# Patient Record
Sex: FEMALE | Race: WHITE
[De-identification: names, ages, dates, MRNs, and addresses within clinical notes are randomized per-mention and may not be internally consistent; named-entity substitution may affect disease eponyms.]

---

## 2018-02-20 NOTE — EKG
[place] [address]
Phone:  [phone]                     ELECTROCARDIOGRAM REPORT      
_______________________________________________________________________________
 
Name:       LANE KING                  [facility]                      [id number]      Account #:      [id number]  
Admission:  18     Attend Phys:                         
Discharge:  18     Date of Birth:  [birth year]  
         Report #: [id number]
    [id number]
_______________________________________________________________________________
THIS REPORT FOR:  //name//                      
 
                         [facility] ED
                                       
Test Date:    2018               Test Time:    21:53:46
Pat Name:     LANE KING              Department:   
Patient ID:   [id number]            Room:          
Gender:       F                        Technician:   VITOR
:          1945               Requested By: Bladimir Souza
Order Number: [id number] MD:   Gray Mcgraw
                                 Measurements
Intervals                              Axis          
Rate:         82                       P:            65
AL:           190                      QRS:          28
QRSD:         132                      T:            6
QT:           406                                    
QTc:          475                                    
                           Interpretive Statements
Sinus rhythm
 
Compared to ECG 10/18/2015 23:09:14
 
First degree AV block no longer present
Incomplete right bundle-branch block no longer present
 
Electronically Signed On 2018 11:00:53 CST by Gray Mcgraw
[email or web address]
 
 
 
 
 
 
 
 
 
 
 
 
 
 
 
 
  <ELECTRONICALLY SIGNED>
                                           By: Gray Mcgraw MD, [facility]      
  18     1100
D: 18   _____________________________________
T: 18   Gray Mcgraw MD, FACC        /EPI

## 2018-07-10 NOTE — EKG
[place] [address]
Phone:  [phone]                     ELECTROCARDIOGRAM REPORT      
_______________________________________________________________________________
 
Name:       LANE KING                  Room:                      [facility]#:  [id number]      Account #:      [id number]  
Admission:  18     Attend Phys:                         
Discharge:  07/10/18     Date of Birth:  [birth year]  
         Report #: [id number]
    [id number]
_______________________________________________________________________________
THIS REPORT FOR:  //name//                      
 
                         [facility] ED
                                       
Test Date:    2018               Test Time:    23:29:36
Pat Name:     LANE KING              Department:   
Patient ID:   [id number]            Room:          
Gender:       F                        Technician:   MS
:          1945               Requested By: Tio Freed
Order Number: [id number] MD:   Wilton Tabares
                                 Measurements
Intervals                              Axis          
Rate:         71                       P:            69
CO:           189                      QRS:          19
QRSD:         100                      T:            31
QT:           430                                    
QTc:          468                                    
                           Interpretive Statements
Sinus rhythm
Probable left atrial enlargement
Low voltage, precordial leads
Baseline wander in lead(s) I
Compared to ECG 2018 21:53:46
Low QRS voltage now present
 
Electronically Signed On 7- 14:31:20 CDT by Wilton Tabares
[email or web address]
 
 
 
 
 
 
 
 
 
 
 
 
 
 
 
 
  <ELECTRONICALLY SIGNED>
                                           By: Wilton Tabares MD, FACC   
  07/10/18     1431
D: 18 2329   _____________________________________
T: 18 2329   Wilton Tabares MD, [facility]     /EPI

## 2018-09-11 NOTE — 2DMMODE
[place] [address]
Phone:  [phone] 2 D/M-MODE ECHOCARDIOGRAM     
_______________________________________________________________________________
 
Name:         LANE KING                 Room:          [address] [place] 
[facility]#:    [id number]     Account #:     [id number]  
Admission:    18    Attend Phys:   Ariela Hodgson, 
Discharge:                Date of Birth: [birth year]  
Date of Service: 18 1624  Report #:      [id number]
        [id number]
_______________________________________________________________________________
THIS REPORT FOR:  //name//                      
 
 
--------------- APPROVED REPORT --------------
 
 
Study performed:  2018 14:15:15
 
EXAM: Comprehensive 2D, Doppler, and color-flow 
Echocardiogram 
Patient Location: In-Patient   
Room #:  200     Status:  routine
 
      BSA:         1.89
HR: 67 bpm BP:          121/69 mmHg 
Rhythm: NSR     
 
Other Information 
Study Quality: Good
 
Indications
Chest Pain
 
2D Dimensions
   LVEF(%):  55.67 (&gt;50%)
IVSd:  8.80 (7-11mm) LVOT Diam:  19.47 (18-24mm) 
LVDd:  38.41 mm  
PWd:  8.75 (7-11mm) Ascending Ao:  33.23 (22-36mm)
LVDs:  27.46 (25-40mm) 
Aortic Root:  34.72 mm 
   Jacobo's LVEF:  55.67 %
 
Volumes
Left Atrial Volume (Systole) 
    LA ESV Index:  26.00 mL/m2
 
Aortic Valve
AoV Peak Scottie.:  1.31 m/s 
AO Peak Gr.:  6.84 mmHg  LVOT Max P.68 mmHg
AO Mean Gr.:  3.72 mmHg  LVOT Mean P.28 mmHg
    LVOT Max V:  1.08 m/s
AO V2 VTI:  27.99 cm  LVOT Mean V:  0.70 m/s
BLAS (VTI):  2.59 cm2  LVOT V1 VTI:  24.38 cm
 
Mitral Valve
    E/A Ratio:  1.22
 
 
[place] [address]
Phone:  [phone]                     2 D/M-MODE ECHOCARDIOGRAM     
_______________________________________________________________________________
 
Name:         LANE KING                 Room:          [address] [place] 
[facility].#:    [id number]     Account #:     [id number]  
Admission:    18    Attend Phys:   Ariela Hodgson, 
Discharge:                Date of Birth: [birth year]  
Date of Service: 18 1624  Report #:      [id number]
        [id number]
_______________________________________________________________________________
    MV Decel. Time:  200.98 ms
MV E Max Scottie.:  1.12 m/s 
MV PHT:  58.28 ms  
MVA (PHT):  3.77 cm2  
 
TDI
E/Lateral E':  10.18 E/Medial E':  7.47
   Medial E' Scottie.:  0.15 m/s
   Lateral E' Scottie.:  0.11 m/s
 
Pulmonary Valve
PV Peak Scottie.:  0.83 m/s PV Peak Gr.:  2.74 mmHg
 
Tricuspid Valve
    RAP Estimate:  5.00 mmHg
TR Peak Gr.:  29.45 mmHg RVSP:  34.45 mmHg
    PA Pressure:  34.45 mmHg
 
Left Ventricle
The left ventricle is normal size. There is normal LV segmental wall 
motion. There is normal left ventricular wall thickness. Left 
ventricular systolic function is normal. LVEF is 65%. Grade II - 
pseudonormal filling dynamics.
 
Right Ventricle
The right ventricle is normal size. The right ventricular systolic 
function is normal.
 
Atria
The left atrium size is normal. The right atrium size is 
normal.
 
Aortic Valve
The aortic valve is normal in structure. No aortic regurgitation is 
present. There is no aortic valvular stenosis.
 
Mitral Valve
The mitral valve is normal in structure. Mild mitral regurgitation. 
No evidence of mitral valve stenosis.
 
Tricuspid Valve
The tricuspid valve is normal in structure. Trace tricuspid 
regurgitation. Mild pulmonary hypertension.
 
Pulmonic Valve
The pulmonary valve is normal in structure. There is no pulmonic 
 
 
[place] [address]
Phone:  [phone]                     2 D/M-MODE ECHOCARDIOGRAM     
_______________________________________________________________________________
 
Name:         L[address] KING [address]                 Room:          [address]#:    [id number]     Account #:     [id number]  
Admission:    18    Attend Phys:   Ariela Hodgson, 
Discharge:                Date of Birth: [birth year]  
Date of Service: 18 1624  Report #:      [id number]
        [id number]
_______________________________________________________________________________
valvular regurgitation.
 
Great Vessels
The aortic root is normal in size. IVC is normal in size and 
collapses with &gt;50% inspiration
 
Pericardium
There is no pericardial effusion.
 
&lt;Conclusion&gt;
The left ventricle is normal size.
There is normal left ventricular wall thickness.
Left ventricular systolic function is normal.
LVEF is 65%.
Grade II - pseudonormal filling dynamics.
Mild mitral regurgitation.
Trace tricuspid regurgitation.
Mild pulmonary hypertension.
 
 
 
 
 
 
 
 
 
 
 
 
 
 
 
 
 
 
 
 
 
 
 
 
 
 
  <ELECTRONICALLY SIGNED>
                                           By: Alejandro Palomino MD, FACC   
  18     1624
D: 18 1624   _____________________________________
T: 18 1624   Alejandro Palomino MD, FACC     /INF

## 2018-09-11 NOTE — EKG
[place] [address]
Phone:  [phone]                     ELECTROCARDIOGRAM REPORT      
_______________________________________________________________________________
 
Name:       LANE KING                  Room:           [address]    ADM IN  
[facility]#:  [id number]      Account #:      [id number]  
Admission:  18     Attend Phys:    Ariela Hodgson MD 
Discharge:               Date of Birth:  [birth year]  
         Report #: [id number]
    [id number]
_______________________________________________________________________________
THIS REPORT FOR:  //name//                      
 
                         [facility] ED
                                       
Test Date:    2018               Test Time:    12:02:22
Pat Name:     LANE KING              Department:   
Patient ID:   [id number]            Room:         [facility]
Gender:       F                        Technician:   ENID
:          1945               Requested By: Blayne Pat
Order Number: [id number] MD:   Gray Mcgraw
                                 Measurements
Intervals                              Axis          
Rate:         72                       P:            53
VA:           188                      QRS:          17
QRSD:         81                       T:            3
QT:           431                                    
QTc:          472                                    
                           Interpretive Statements
Sinus rhythm
Compared to ECG 2018 23:29:36
No significant changes
 
Electronically Signed On 2018 17:15:52 CDT by Gray Mcgraw
[email or web address]
 
 
 
 
 
 
 
 
 
 
 
 
 
 
 
 
 
 
 
  <ELECTRONICALLY SIGNED>
                                           By: Gray Mcgraw MD, [facility]      
  18     1715
D: 18 1202   _____________________________________
T: 18 120   Gray Mcgraw MD, FAC        /EPI

## 2018-09-12 NOTE — CARDNUC
[place] [address]
Phone:  [phone]                     CARDIAC NUCLEAR IMAGING REPORT
_______________________________________________________________________________
 
Name:         L[address] KING                 [address]          [address]    [id number]     Account #:     [id number]  
Admission:    09/11/18    Attend Phys:   Ariela Hodgson, 
Discharge:                Date of Birth: [birth year]  
Date of Service: 09/12/18 1637  Report #:      [id number]
        [id number] 
_______________________________________________________________________________
THIS REPORT FOR:  //name//                      
 
 
--------------- APPROVED REPORT --------------
 
 
Study performed:  09/12/2018 07:31:00
 
Indication: Chest pain
Patient Location: In-Patient
Stress Tech: [facility]
Stress Nurse: Gi Escoto RN
 
Ht: 5 ft 8 in  Wt: 167 lbs  BSA:  1.89 m2
    BMI:  25.38
 
Medical History
Medical History: cad, hyperlipidemia, hypertension, pvd
Medications: xarelto, atorvastatin, sotalol, amlodipine, aspirin 
325
Allergies: nkda
Cardiac Risk Factors: age, hyperlipidemia, hypertension, pvd
Previous Cardiac Procedures: none
Exercise History: Indeterminate
 
Resting Data
Rest SPECT myocardial perfusion imaging was performed in supine 
position 30 minutes following the intravenous injection of 10.5 mCi 
of Tc-99m Sestamibi.
Time of rest injection: 12:10     
The images were gated to evaluate regional wall motion and calculate 
left ventricular ejection fraction. 
Administration Route: IV
Administration Site: Left AC
 
Pharmacologic Stress
Pharmacologic stress test was performed by injecting Regadenoson 0.4 
mg IV push over 10-15 seconds immediately followed by the intravenous 
injection of 35.5 mCi of Tc-99m Sestamibi.
Time of stress injection: 13:25     
Administration Route: IV
Administration Site: Left AC
Heart Rate at time of stress injection: 101 bpm.
Gated Stress SPECT was performed 40 minutes after stress 
injection.
The images were gated to evaluate regional wall motion and calculate 
 
 
[place] [address]
Phone:  [phone]                     CARDIAC NUCLEAR IMAGING REPORT
_______________________________________________________________________________
 
Name:         L[address] KING                 Room:          [address]#:    [id number]     Account #:     [id number]  
Admission:    09/11/18    Attend Phys:   Ariela Hodgson, 
Discharge:                Date of Birth: [birth year]  
Date of Service: 09/12/18 1637  Report #:      [id number]
        [id number] 
_______________________________________________________________________________
left ventricular ejection fraction. 
Prone imaging was performed.
 
Stress Test Details
Stress Test:  Pharmacologic stress testing performed using 0.4 mg of 
regadenoson per 5 mL given IV over 10 seconds.      
  Reason for pharmacologic stress test: physical limitation.      
 
HR      Max Heart Rate (APMHR): 147 bpm  
Resting HR:            67 bpm   Target HR (85% APMHR): 124 bpm  
Max HR Achieved:  101 bpm        
% of APMHR:         68         
Recovery HR:            97 bpm        
 
BP           
Resting BP:  114/72 mmHg        
 
Recovery BP:       130/74 mmHg        
ECG           
Resting ECG:  Sinus Rhythm, normal EKG       
Stress ECG:     Sinus Tachycardia       
ST Change: None          
Arrhythmia:    None         
Recovery ECG: Sinus Rhythm, normal EKG       
Recovery ST Change: None        
Recovery Arrhythmia: None        
 
Clinical
Reason for Termination: Completed protocol
Exercise duration: 0 min  sec
Exercise capacity: 1 METs
The patient had no significant symptoms with Lexiscan 
infusion.
 
Stress ECG Conclusion
The baseline 12-lead EKG shows sinus rhythm without significant ST or 
T wave abnormality. EKGs obtained during and post Lexiscan show sinus 
rhythm and sinus tachycardia with no significant ST or T wave changes 
when compared baseline. There were no stress-induced 
arrhythmias.
 
Study Quality
Study: Good
Artifact: No artifact 
 
Study Data
 
 
[place] [address]
Phone:  [phone]                     CARDIAC NUCLEAR IMAGING REPORT
_______________________________________________________________________________
 
Name:         [address]                 [address]          [address]    [id number]     Account #:     [id number]  
Admission:    09/11/18    Attend Phys:   Ariela Hodgson, 
Discharge:                Date of Birth: [birth year]  
Date of Service: 09/12/18 1637  Report #:      [id number]
        [id number] 
_______________________________________________________________________________
At rest, the left ventricular ejection fraction was 84%..   
Post stress, the left ventricular ejection was 85%..   
TID = 1.02.       
 
Perfusion
Normal left ventricular perfusion.
 
Wall Motion
Normal left ventricular wall motion.
 
Nuclear Conclusion
ECG Findings: negative for ischemia 
Clinical Findings: negative for ischemia 
Nuclear Findings: negative for ischemia 
Exercise Capacity: not assessed
Left Ventricular Function: normal 
Risk Study: low
Myocardial perfusion images show no defect to suggest infarct or 
ischemia. Left ventricular systolic function is normal. This is a low 
risk study. 
 
&lt;Conclusion&gt;
The baseline 12-lead EKG shows sinus rhythm without significant ST or 
T wave abnormality. EKGs obtained during and post Lexiscan show sinus 
rhythm and sinus tachycardia with no significant ST or T wave changes 
when compared baseline. There were no stress-induced arrhythmias.
 
 
 
 
 
 
 
 
 
 
 
 
 
 
 
 
 
 
  <ELECTRONICALLY SIGNED>
                                           By: Alejandro Palomino MD, FACC   
  09/12/18     1637
D: 09/12/18 1637   _____________________________________
T: 09/12/18 1637   Alejandro Palomino MD, FACC     /INF

## 2019-04-06 NOTE — EKG
[place] [address]
Phone:  [phone]                     ELECTROCARDIOGRAM REPORT      
_______________________________________________________________________________
 
Name:       LANE KING                  Room:                      [facility]#:  [id number]      Account #:      [id number]  
Admission:  19     Attend Phys:                         
Discharge:  19     Date of Birth:  [birth year]  
         Report #: [id number]
    [id number]
_______________________________________________________________________________
THIS REPORT FOR:  //name//                      
 
                         [facility] ED
                                       
Test Date:    2019               Test Time:    14:30:57
Pat Name:     LANE KING              Department:   
Patient ID:   [id number]            Room:          
Gender:       F                        Technician:   ROSALIA WESTON
:          1945               Requested By: Mariana Pacheco
Order Number: [id number] MD:   Gray Mcgraw
                                 Measurements
Intervals                              Axis          
Rate:         65                       P:            38
DE:           194                      QRS:          40
QRSD:         93                       T:            25
QT:           454                                    
QTc:          473                                    
                           Interpretive Statements
Sinus rhythm
 
Compared to ECG 2018 12:02:22
no change
 
Electronically Signed On 2019 11:51:49 CDT by Gray Mcgraw
[email or web address]
 
 
 
 
 
 
 
 
 
 
 
 
 
 
 
 
 
 
  <ELECTRONICALLY SIGNED>
                                           By: Gray Mcgraw MD, [facility]      
  19     1151
D: 19 1430   _____________________________________
T: 19 1430   Gray Mcgraw MD, FACC        /EPI

## 2019-06-09 ENCOUNTER — HOSPITAL ENCOUNTER (EMERGENCY)
Dept: HOSPITAL 96 - M.ERS | Age: 74
Discharge: HOME | End: 2019-06-09
Payer: MEDICARE

## 2019-06-09 VITALS — WEIGHT: 175 LBS | BODY MASS INDEX: 26.52 KG/M2 | HEIGHT: 68 IN

## 2019-06-09 VITALS — SYSTOLIC BLOOD PRESSURE: 177 MMHG | DIASTOLIC BLOOD PRESSURE: 80 MMHG

## 2019-06-09 DIAGNOSIS — S80.862A: Primary | ICD-10-CM
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
## 2019-06-09 DIAGNOSIS — E78.00: ICD-10-CM
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
## 2019-06-09 DIAGNOSIS — I10: ICD-10-CM

## 2019-06-09 DIAGNOSIS — I48.91: ICD-10-CM
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
## 2019-11-03 ENCOUNTER — HOSPITAL ENCOUNTER (OUTPATIENT)
Dept: HOSPITAL 96 - M.ERS | Age: 74
Setting detail: OBSERVATION
LOS: 2 days | Discharge: HOME | End: 2019-11-05
Attending: FAMILY MEDICINE | Admitting: FAMILY MEDICINE
Payer: MEDICARE

## 2019-11-03 VITALS — DIASTOLIC BLOOD PRESSURE: 68 MMHG | SYSTOLIC BLOOD PRESSURE: 137 MMHG

## 2019-11-03 VITALS — SYSTOLIC BLOOD PRESSURE: 124 MMHG | DIASTOLIC BLOOD PRESSURE: 75 MMHG

## 2019-11-03 VITALS — DIASTOLIC BLOOD PRESSURE: 85 MMHG | SYSTOLIC BLOOD PRESSURE: 130 MMHG

## 2019-11-03 VITALS — BODY MASS INDEX: 26.59 KG/M2 | HEIGHT: 69.02 IN | WEIGHT: 179.5 LBS

## 2019-11-03 DIAGNOSIS — I48.20: Primary | ICD-10-CM

## 2019-11-03 DIAGNOSIS — I73.9: ICD-10-CM

## 2019-11-03 DIAGNOSIS — Z79.899: ICD-10-CM

## 2019-11-03 DIAGNOSIS — I82.412: ICD-10-CM

## 2019-11-03 DIAGNOSIS — D68.59: ICD-10-CM

## 2019-11-03 DIAGNOSIS — Z79.01: ICD-10-CM

## 2019-11-03 DIAGNOSIS — I27.20: ICD-10-CM

## 2019-11-03 DIAGNOSIS — R91.1: ICD-10-CM

## 2019-11-03 DIAGNOSIS — I10: ICD-10-CM

## 2019-11-03 DIAGNOSIS — E78.5: ICD-10-CM

## 2019-11-03 LAB
ABSOLUTE BASOPHILS: 0.1 THOU/UL (ref 0–0.2)
ABSOLUTE EOSINOPHILS: 0.1 THOU/UL (ref 0–0.7)
ABSOLUTE MONOCYTES: 0.7 THOU/UL (ref 0–1.2)
ALBUMIN SERPL-MCNC: 3.6 G/DL (ref 3.4–5)
ALP SERPL-CCNC: 96 U/L (ref 46–116)
ALT SERPL-CCNC: 33 U/L (ref 30–65)
ANION GAP SERPL CALC-SCNC: 9 MMOL/L (ref 7–16)
APTT BLD: 35.5 SECONDS (ref 25–31.3)
AST SERPL-CCNC: 27 U/L (ref 15–37)
BASOPHILS NFR BLD AUTO: 0.9 %
BILIRUB SERPL-MCNC: 0.3 MG/DL
BUN SERPL-MCNC: 25 MG/DL (ref 7–18)
CALCIUM SERPL-MCNC: 8.8 MG/DL (ref 8.5–10.1)
CHLORIDE SERPL-SCNC: 102 MMOL/L (ref 98–107)
CO2 SERPL-SCNC: 27 MMOL/L (ref 21–32)
CREAT SERPL-MCNC: 1.1 MG/DL (ref 0.6–1.3)
EOSINOPHIL NFR BLD: 1.5 %
GLUCOSE SERPL-MCNC: 128 MG/DL (ref 70–99)
GRANULOCYTES NFR BLD MANUAL: 58.9 %
HCT VFR BLD CALC: 39 % (ref 37–47)
HGB BLD-MCNC: 13.3 GM/DL (ref 12–15)
INR PPP: 1
LIPASE: 177 U/L (ref 73–393)
LYMPHOCYTES # BLD: 1.5 THOU/UL (ref 0.8–5.3)
LYMPHOCYTES NFR BLD AUTO: 26.3 %
MCH RBC QN AUTO: 31.9 PG (ref 26–34)
MCHC RBC AUTO-ENTMCNC: 34.2 G/DL (ref 28–37)
MCV RBC: 93.4 FL (ref 80–100)
MONOCYTES NFR BLD: 12.4 %
MPV: 8 FL. (ref 7.2–11.1)
NEUTROPHILS # BLD: 3.3 THOU/UL (ref 1.6–8.1)
NT-PRO BRAIN NAT PEPTIDE: 381 PG/ML (ref ?–300)
NUCLEATED RBCS: 0 /100WBC
PLATELET COUNT*: 258 THOU/UL (ref 150–400)
POTASSIUM SERPL-SCNC: 3.9 MMOL/L (ref 3.5–5.1)
PROT SERPL-MCNC: 9.2 G/DL (ref 6.4–8.2)
PROTHROMBIN TIME: 10.7 SECONDS (ref 9.2–11.5)
RBC # BLD AUTO: 4.18 MIL/UL (ref 4.2–5)
RDW-CV: 14.3 % (ref 10.5–14.5)
SODIUM SERPL-SCNC: 138 MMOL/L (ref 136–145)
WBC # BLD AUTO: 5.7 THOU/UL (ref 4–11)

## 2019-11-03 NOTE — NUR
RECEIVED REPORT FROM ER AND ADMITTED TO ROOM AT 1945. TELEMETRY APPLIED
SHOWING A-FIB AND HAVING SINUS BEATS ALSO. CARDIZEM INFUSING AT 5MG/HR. PT
DENIES CP BUT HAVING A HEADACH. SEE ADMISSION ASSESSMENT AND HX. WILL CONT TO
MONITOR AND ASSIST AS NEEDED.

## 2019-11-04 VITALS — SYSTOLIC BLOOD PRESSURE: 127 MMHG | DIASTOLIC BLOOD PRESSURE: 60 MMHG

## 2019-11-04 VITALS — SYSTOLIC BLOOD PRESSURE: 108 MMHG | DIASTOLIC BLOOD PRESSURE: 60 MMHG

## 2019-11-04 VITALS — DIASTOLIC BLOOD PRESSURE: 59 MMHG | SYSTOLIC BLOOD PRESSURE: 109 MMHG

## 2019-11-04 VITALS — SYSTOLIC BLOOD PRESSURE: 109 MMHG | DIASTOLIC BLOOD PRESSURE: 61 MMHG

## 2019-11-04 VITALS — SYSTOLIC BLOOD PRESSURE: 106 MMHG | DIASTOLIC BLOOD PRESSURE: 57 MMHG

## 2019-11-04 VITALS — SYSTOLIC BLOOD PRESSURE: 141 MMHG | DIASTOLIC BLOOD PRESSURE: 67 MMHG

## 2019-11-04 LAB
INR PPP: 1.3
MAGNESIUM SERPL-MCNC: 2.2 MG/DL (ref 1.8–2.4)
PHOSPHATE SERPL-MCNC: 3.4 MG/DL (ref 2.5–4.9)
PROTHROMBIN TIME: 13.2 SECONDS (ref 9.2–11.5)

## 2019-11-04 NOTE — 2DMMODE
[place] [address]
Phone:  [phone] 2 D/M-MODE ECHOCARDIOGRAM     
_______________________________________________________________________________
 
Name:         FERNANDO,LANESTEFANIA NAVARRETE          Room:          [address] Gabriela [facility]#:    [id number]     Account #:     [id number]  
Admission:    19    Attend Phys:   Tony Carlson, 
Discharge:                Date of Birth: [birth year]  
Date of Service: 19 1400  Report #:      [id number]
        [id number]
_______________________________________________________________________________
THIS REPORT FOR:  //name//                      
 
 
--------------- APPROVED REPORT --------------
 
 
Study performed:  2019 10:42:31
 
EXAM: Comprehensive 2D, Doppler, and color-flow 
Echocardiogram 
Patient Location: In-Patient   
Room #:  201     Status:  routine
 
      BSA:         1.98
HR: 70 bpm BP:          106/57 mmHg 
Rhythm: NSR     
 
Other Information 
Study Quality: Good
 
Indications
Atrial Fibrillation
 
2D Dimensions
IVSd:  10.62 (7-11mm) LVOT Diam:  18.89 (18-24mm) 
LVDd:  40.81 mm  
PWd:  9.46 (7-11mm) Ascending Ao:  33.52 (22-36mm)
LVDs:  21.47 (25-40mm) 
Aortic Root:  34.08 mm 
 
Volumes
Left Atrial Volume (Systole) 
    LA ESV Index:  28.70 mL/m2
 
Aortic Valve
AoV Peak Scottie.:  1.31 m/s 
AO Peak Gr.:  6.86 mmHg  LVOT Max P.81 mmHg
AO Mean Gr.:  3.47 mmHg  LVOT Mean P.66 mmHg
    LVOT Max V:  1.10 m/s
AO V2 VTI:  29.77 cm  LVOT Mean V:  0.76 m/s
BLAS (VTI):  2.55 cm2  LVOT V1 VTI:  27.10 cm
 
Mitral Valve
    E/A Ratio:  1.44
    MV Decel. Time:  180.26 ms
MV E Max Scottie.:  1.11 m/s 
 
 
[place] [address]
Phone:  [phone]                     2 D/M-MODE ECHOCARDIOGRAM     
_______________________________________________________________________________
 
Name:         LANE KING          Room:          [address]
KRZYSZTOF#:    [id number]     Account #:     [id number]  
Admission:    19    Attend Phys:   Tony Carlson, 
Discharge:                Date of Birth: [birth year]  
Date of Service: 19 1400  Report #:      [id number]
        [id number]
_______________________________________________________________________________
MV PHT:  52.28 ms  
MVA (PHT):  4.21 cm2  
 
TDI
E/Lateral E':  10.09 E/Medial E':  6.94
   Medial E' Scottie.:  0.16 m/s
   Lateral E' Scottie.:  0.11 m/s
 
Pulmonary Valve
PV Peak Scottie.:  0.85 m/s PV Peak Gr.:  2.89 mmHg
 
Left Ventricle
The left ventricle is normal size. There is normal LV segmental wall 
motion. There is normal left ventricular wall thickness. Left 
ventricular systolic function is normal. The left ventricular 
ejection fraction is within the normal range. LVEF is 60%. The left 
ventricular diastolic function is normal.
 
Right Ventricle
The right ventricle is normal size. The right ventricular systolic 
function is normal.
 
Atria
The left atrium size is normal. The right atrium size is 
normal.
 
Aortic Valve
The aortic valve is normal in structure. No aortic regurgitation is 
present. There is no aortic valvular stenosis.
 
Mitral Valve
Mitral valve leaflets are calcified. Mild mitral regurgitation. No 
evidence of mitral valve stenosis.
 
Tricuspid Valve
The tricuspid valve is normal in structure. Trace tricuspid 
regurgitation.
 
Pulmonic Valve
The pulmonary valve is normal in structure. There is no pulmonic 
valvular regurgitation.
 
Great Vessels
The aortic root is normal in size. IVC is normal in size and 
collapses >50% with inspiration.
 
 
 
[place] [address]
Phone:  [phone]                     2 D/M-MODE ECHOCARDIOGRAM     
_______________________________________________________________________________
 
Name:         L[address] KING [address]          Room:          [address]
KRZYSZTOF#:    [id number]     Account #:     [id number]  
Admission:    19    Attend Phys:   Tony Carlson, 
Discharge:                Date of Birth: [birth year]  
Date of Service: 19 1400  Report #:      [id number]
        [id number]
_______________________________________________________________________________
Pericardium
There is no pericardial effusion.
 
<Conclusion>
The left ventricle is normal size.
There is normal left ventricular wall thickness.
Left ventricular systolic function is normal.
The left ventricular ejection fraction is within the normal 
range.
LVEF is 60%.
The left ventricular diastolic function is normal.
The right ventricle is normal size.
The left atrium size is normal.
The aortic valve is normal in structure.
Mitral valve leaflets are calcified.
Mild mitral regurgitation.
No evidence of mitral valve stenosis.
The tricuspid valve is normal in structure.
IVC is normal in size and collapses >50% with inspiration.
There is no pericardial effusion.
There is normal LV segmental wall motion.
 
 
 
 
 
 
 
 
 
 
 
 
 
 
 
 
 
 
 
 
 
 
 
  <ELECTRONICALLY SIGNED>
                                           By: Mesfin Gracia MD, FACC     
  19     1400
D: 19 1400   _____________________________________
T: 19   Mesfin Gracia MD, FACC       /INF

## 2019-11-04 NOTE — NUR
Pt is A&O. Resides at home with her . Independent and active. No DME.
No hx of HH or SNF. Goal is home at dc, no needs. Per Pt, she is hopeful to dc
to home today.

## 2019-11-04 NOTE — NUR
SLEPT WELL TONIGHT. GAIT STEADY TO AND FROM BR INDEPENDENTLY. TELEMETRY NOW
SHOWING SR WITH CARDIZEM INFUSING. DENIES CP OR HA. HS GOALS OF REST AND
SAFETY ACHIEVED. HOURLY ROUNDING OBSERVED. NPO SINCE [place] FOR POSS TEST TODAY.

## 2019-11-04 NOTE — EKG
[place] [address]
Phone:  [phone]                     ELECTROCARDIOGRAM REPORT      
_______________________________________________________________________________
 
Name:       FERNANDOLANESTEFANIA NAVARRETE           Room:           [address] 
M.R.#:  [id number]      Account #:      [id number]  
Admission:  19     Attend Phys:    Tony Carlson MD 
Discharge:               Date of Birth:  [birth year]  
         Report #: [id number]
    [id number]
_______________________________________________________________________________
THIS REPORT FOR:  //name//                      
 
                         [facility] ED
                                       
Test Date:    2019               Test Time:    17:41:54
Pat Name:     [address]              Department:   
Patient ID:   [id number]            Room:         [facility]
Gender:       F                        Technician:   Inderjit Norton
:          1945               Requested By: Yuki Mandujano
Order Number: [id number] MD:   Mesfin Gracia
                                 Measurements
Intervals                              Axis          
Rate:         82                       P:            
WA:                                    QRS:          43
QRSD:         97                       T:            21
QT:           426                                    
QTc:          498                                    
                           Interpretive Statements
Atrial fibrillation
Low voltage, extremity and precordial leads
Borderline prolonged QT interval
Baseline wander in lead(s) V1
Compared to ECG 2019 14:30:57
Low QRS voltage now present
Sinus rhythm no longer present
 
Electronically Signed On 2019 12:13:13 CST by Mesfin Gracia
[email or web address]
 
 
 
 
 
 
 
 
 
 
 
 
 
 
 
  <ELECTRONICALLY SIGNED>
                                           By: Mesfin Gracia MD, FACC     
  19     1213
D: 19 1741   _____________________________________
T: 19 1741   Mesfin Gracia MD, FACC       /EPI

## 2019-11-04 NOTE — EKG
[place] [address]
Phone:  [phone]                     ELECTROCARDIOGRAM REPORT      
_______________________________________________________________________________
 
Name:       FERNANDOLANE           Room:           [address] 
M.R.#:  [id number]      Account #:      [id number]  
Admission:  19     Attend Phys:    Tony Carlson MD 
Discharge:               Date of Birth:  [birth year]  
         Report #: [id number]
    [id number]
_______________________________________________________________________________
THIS REPORT FOR:  //name//                      
 
                         [facility] ED
                                       
Test Date:    2019               Test Time:    17:20:25
Pat Name:     [address]              Department:   
Patient ID:   [id number]            Room:         [facility]
Gender:       F                        Technician:   
:          1945               Requested By: Yuki Mandujano
Order Number: [id number] MD:   Mesfin Gracia
                                 Measurements
Intervals                              Axis          
Rate:         136                      P:            
LA:                                    QRS:          45
QRSD:         100                      T:            -30
QT:           333                                    
QTc:          501                                    
                           Interpretive Statements
Atrial flutter with predominantly 2;1 AV block
Low voltage, precordial leads
Repolarization abnormality, prob rate related
Prolonged QT interval
Compared to ECG 2019 14:30:57
Low QRS voltage now present
Early repolarization now present
Prolonged QT interval now present
Sinus rhythm no longer present
 
Electronically Signed On 2019 12:12:29 CST by Mesfin Gracia
[email or web address]
 
 
 
 
 
 
 
 
 
 
 
 
 
  <ELECTRONICALLY SIGNED>
                                           By: Mesfin Gracia MD, FACC     
  19     1212
D: 19 1720   _____________________________________
T: 19 1720   Mesfin Gracia MD, FAC       /EPI

## 2019-11-04 NOTE — EKG
[place] [address]
Phone:  [phone]                     ELECTROCARDIOGRAM REPORT      
_______________________________________________________________________________
 
Name:       L[address] KING           Room:           [address] 
M.R.#:  [id number]      Account #:      [id number]  
Admission:  19     Attend Phys:    Tony Carlson MD 
Discharge:               Date of Birth:  [birth year]  
         Report #: [id number]
    [id number]
_______________________________________________________________________________
THIS REPORT FOR:  //name//                      
 
                          [facility]
                                       
Test Date:    2019               Test Time:    09:40:54
Pat Name:     [address]              Department:   
Patient ID:   [id number]            Room:         [address]
Gender:       F                        Technician:   
:          1945               Requested By: Shelli Ho
Order Number: [id number]    Oralia MD:   Mesfin Gracia
                                 Measurements
Intervals                              Axis          
Rate:         68                       P:            49
TN:           212                      QRS:          29
QRSD:         84                       T:            12
QT:           443                                    
QTc:          472                                    
                           Interpretive Statements
Sinus rhythm
Borderline prolonged [place] interval
Low voltage, precordial leads
Compared to ECG 2019 14:30:57
Low QRS voltage now present
 
Electronically Signed On 2019 12:15:01 CST by Mesfin Gracia
[email or web address]
 
 
 
 
 
 
 
 
 
 
 
 
 
 
 
 
 
  <ELECTRONICALLY SIGNED>
                                           By: Mesfin Gracia MD, [facility]     
  19     1215
D: 19 0940   _____________________________________
T: 19 0940   Mesfin Gracia MD, [facility]       /EPI

## 2019-11-04 NOTE — EKG
[place] [address]
Phone:  [phone]                     ELECTROCARDIOGRAM REPORT      
_______________________________________________________________________________
 
Name:       FERNANDOLANE           Room:           [address] 
M.R.#:  [id number]      Account #:      [id number]  
Admission:  19     Attend Phys:    Tony Carlson MD 
Discharge:               Date of Birth:  [birth year]  
         Report #: [id number]
    [id number]
_______________________________________________________________________________
THIS REPORT FOR:  //name//                      
 
                         [facility] ED
                                       
Test Date:    2019               Test Time:    17:40:34
Pat Name:     [address]              Department:   
Patient ID:   [id number]            Room:         [facility]
Gender:       F                        Technician:   Inderjit Norton
:          1945               Requested By: Yuki Mandujano
Order Number: [id number] MD:   Mesfin Gracia
                                 Measurements
Intervals                              Axis          
Rate:         95                       P:            57
ME:           226                      QRS:          43
QRSD:         87                       T:            21
QT:           377                                    
QTc:          474                                    
                           Interpretive Statements
Sinus rhythm
Atrial premature complexes
Prolonged ME interval
Low voltage, precordial leads
Compared to ECG 2019 14:30:57
Atrial premature complex(es) now present
First degree AV block now present
Low QRS voltage now present
 
Electronically Signed On 2019 12:12:43 CST by Mesfin Gracia
[email or web address]
 
 
 
 
 
 
 
 
 
 
 
 
 
 
  <ELECTRONICALLY SIGNED>
                                           By: Mesfin Gracia MD, FACC     
  19     1212
D: 19 1740   _____________________________________
T: 19 1740   Mesfin Gracia MD, FACC       /EPI

## 2019-11-04 NOTE — NUR
PT VSS, PT SR TO SR 1* AV BLOCK ON TELE, PT CARTIZEM DRIP DCD. PT UP AD MAYKEL,
HOURLY ROUNDING PERFORMED, POSSESSIONS AND CALL LIGHT WITHIN REACH. DVT
PROTOCOL IN PLACE.

## 2019-11-05 VITALS — DIASTOLIC BLOOD PRESSURE: 55 MMHG | SYSTOLIC BLOOD PRESSURE: 111 MMHG

## 2019-11-05 VITALS — DIASTOLIC BLOOD PRESSURE: 64 MMHG | SYSTOLIC BLOOD PRESSURE: 114 MMHG

## 2019-11-05 VITALS — DIASTOLIC BLOOD PRESSURE: 60 MMHG | SYSTOLIC BLOOD PRESSURE: 104 MMHG

## 2019-11-05 VITALS — SYSTOLIC BLOOD PRESSURE: 114 MMHG | DIASTOLIC BLOOD PRESSURE: 64 MMHG

## 2019-11-05 LAB
ANION GAP SERPL CALC-SCNC: 5 MMOL/L (ref 7–16)
BUN SERPL-MCNC: 17 MG/DL (ref 7–18)
CALCIUM SERPL-MCNC: 9.1 MG/DL (ref 8.5–10.1)
CHLORIDE SERPL-SCNC: 102 MMOL/L (ref 98–107)
CO2 SERPL-SCNC: 30 MMOL/L (ref 21–32)
CREAT SERPL-MCNC: 0.8 MG/DL (ref 0.6–1.3)
GLUCOSE SERPL-MCNC: 87 MG/DL (ref 70–99)
HCT VFR BLD CALC: 39.5 % (ref 37–47)
HGB BLD-MCNC: 13.1 GM/DL (ref 12–15)
INR PPP: 1.1
MCH RBC QN AUTO: 31.4 PG (ref 26–34)
MCHC RBC AUTO-ENTMCNC: 33.3 G/DL (ref 28–37)
MCV RBC: 94.2 FL (ref 80–100)
MPV: 8.5 FL. (ref 7.2–11.1)
PLATELET COUNT*: 253 THOU/UL (ref 150–400)
POTASSIUM SERPL-SCNC: 3.6 MMOL/L (ref 3.5–5.1)
PROTHROMBIN TIME: 11.3 SECONDS (ref 9.2–11.5)
RBC # BLD AUTO: 4.19 MIL/UL (ref 4.2–5)
RDW-CV: 14.3 % (ref 10.5–14.5)
SODIUM SERPL-SCNC: 137 MMOL/L (ref 136–145)
WBC # BLD AUTO: 4.9 THOU/UL (ref 4–11)

## 2019-11-05 NOTE — EKG
[place] [address]
Phone:  [phone]                     ELECTROCARDIOGRAM REPORT      
_______________________________________________________________________________
 
Name:       FERNANDOLANESTEFANIA NAVARRETE           Room:           [address] 
M.R.#:  [id number]      Account #:      [id number]  
Admission:  19     Attend Phys:    Shelli Rodriguez
Discharge:  19     Date of Birth:  [birth year]  
         Report #: [id number]
    [id number]
_______________________________________________________________________________
THIS REPORT FOR:  //name//                      
 
                          [facility]
                                       
Test Date:    2019               Test Time:    10:51:47
Pat Name:     [address]              Department:   
Patient ID:   [id number]            Room:         [address]
Gender:       F                        Technician:   
:          1945               Requested By: Chloe Damon
Order Number: [id number]    Oralia MD:   Alejandro Palomino
                                 Measurements
Intervals                              Axis          
Rate:         66                       P:            56
[place]:           214                      QRS:          36
QRSD:         86                       T:            21
QT:           440                                    
QTc:          461                                    
                           Interpretive Statements
Sinus rhythm
Borderline prolonged [place] interval
Compared to ECG 2019 09:40:54
No significant changes
 
Electronically Signed On 2019 17:29:47 CST by Alejandro Palomino
[email or web address]
 
 
 
 
 
 
 
 
 
 
 
 
 
 
 
 
 
 
  <ELECTRONICALLY SIGNED>
                                           By: Alejandro Palomino MD, [facility]   
  19     1729
D: 19 1051   _____________________________________
T: 19 1051   Alejandro Palomino MD, [facility]     /EPI

## 2019-11-05 NOTE — NUR
ASSUMED CARE OF PT AFTER REPORT AT 1930. PT A&OX4. VSS. PHYSICAL ASSESSMENT
COMPLETED AND CHARTED. PT ON RA. PT TRACING SR/SB/1ST DEG ON TELE. PT UP ADLIB
TO BSC. PT DENIES ANY PAIN OR DISCOMFORT. PT ABLE TO SLEEP WELL ON BED. CALL
LIGHT WITHIN REACH.

## 2019-11-05 NOTE — NUR
ASSUMED PT CARE AT 0700, PT A&O X4, VSS, RA, CARDIAC MONITOR TRACING SINUS
RHYTHM, NO TENDERNESS, WARMTH, OR REDNESS TO LLE NOTED, FULL ASSESSMENT AS
CHARTED. PT DISCHARGED HOME WITH SPOUSE AT APPROX 1545, EDUCATED ON ALL
DISCHARGE INSTRUCTIONS INCLUDING FOLLOW UP APPOINTMENTS AND MEDICATIONS. IV
AND CARDIAC MONITOR REMOVED, HOURLY ROUNDING COMPLETED.

## 2019-12-04 ENCOUNTER — HOSPITAL ENCOUNTER (OUTPATIENT)
Dept: HOSPITAL 96 - M.ULTRA | Age: 74
End: 2019-12-04
Attending: NURSE PRACTITIONER
Payer: MEDICARE

## 2019-12-04 DIAGNOSIS — Z12.31: Primary | ICD-10-CM

## 2019-12-04 DIAGNOSIS — I82.A12: ICD-10-CM

## 2020-02-07 ENCOUNTER — HOSPITAL ENCOUNTER (OUTPATIENT)
Dept: HOSPITAL 96 - M.LAB | Age: 75
End: 2020-02-07
Attending: NURSE PRACTITIONER
Payer: MEDICARE

## 2020-02-07 DIAGNOSIS — R91.1: ICD-10-CM

## 2020-02-07 DIAGNOSIS — R07.9: ICD-10-CM

## 2020-02-07 DIAGNOSIS — Z01.812: Primary | ICD-10-CM

## 2020-02-07 DIAGNOSIS — I82.409: ICD-10-CM

## 2020-02-07 LAB
BUN SERPL-MCNC: 17 MG/DL (ref 7–18)
CREAT SERPL-MCNC: 0.7 MG/DL (ref 0.6–1.3)

## 2020-08-19 ENCOUNTER — HOSPITAL ENCOUNTER (EMERGENCY)
Dept: HOSPITAL 96 - M.ERS | Age: 75
LOS: 1 days | Discharge: HOME | End: 2020-08-20
Payer: MEDICARE

## 2020-08-19 VITALS — BODY MASS INDEX: 26.52 KG/M2 | HEIGHT: 68 IN | WEIGHT: 175 LBS

## 2020-08-19 DIAGNOSIS — I10: ICD-10-CM

## 2020-08-19 DIAGNOSIS — I48.91: ICD-10-CM

## 2020-08-19 DIAGNOSIS — L23.7: Primary | ICD-10-CM

## 2020-08-19 DIAGNOSIS — E78.00: ICD-10-CM

## 2020-08-20 VITALS — SYSTOLIC BLOOD PRESSURE: 166 MMHG | DIASTOLIC BLOOD PRESSURE: 75 MMHG

## 2021-01-27 ENCOUNTER — HOSPITAL ENCOUNTER (OUTPATIENT)
Dept: HOSPITAL 96 - M.RAD | Age: 76
End: 2021-01-27
Attending: FAMILY MEDICINE
Payer: MEDICARE

## 2021-01-27 DIAGNOSIS — Z12.31: Primary | ICD-10-CM

## 2021-03-22 ENCOUNTER — HOSPITAL ENCOUNTER (OUTPATIENT)
Dept: HOSPITAL 96 - M.CT | Age: 76
End: 2021-03-22
Attending: FAMILY MEDICINE
Payer: MEDICARE

## 2021-03-22 DIAGNOSIS — R91.1: ICD-10-CM

## 2021-03-22 DIAGNOSIS — J84.10: Primary | ICD-10-CM

## 2022-02-08 ENCOUNTER — HOSPITAL ENCOUNTER (OUTPATIENT)
Dept: HOSPITAL 96 - M.RAD | Age: 77
End: 2022-02-08
Attending: FAMILY MEDICINE
Payer: MEDICARE

## 2022-02-08 DIAGNOSIS — Z12.31: Primary | ICD-10-CM
